# Patient Record
Sex: FEMALE | Race: WHITE | NOT HISPANIC OR LATINO | Employment: FULL TIME | URBAN - NONMETROPOLITAN AREA
[De-identification: names, ages, dates, MRNs, and addresses within clinical notes are randomized per-mention and may not be internally consistent; named-entity substitution may affect disease eponyms.]

---

## 2017-07-17 ENCOUNTER — OFFICE VISIT (OUTPATIENT)
Dept: URGENT CARE | Facility: PHYSICIAN GROUP | Age: 37
End: 2017-07-17
Payer: COMMERCIAL

## 2017-07-17 VITALS
WEIGHT: 210 LBS | BODY MASS INDEX: 33.75 KG/M2 | HEART RATE: 82 BPM | TEMPERATURE: 97.5 F | SYSTOLIC BLOOD PRESSURE: 126 MMHG | OXYGEN SATURATION: 96 % | DIASTOLIC BLOOD PRESSURE: 82 MMHG | HEIGHT: 66 IN

## 2017-07-17 DIAGNOSIS — G44.209 TENSION HEADACHE: ICD-10-CM

## 2017-07-17 DIAGNOSIS — R11.0 NAUSEA: ICD-10-CM

## 2017-07-17 PROCEDURE — 99214 OFFICE O/P EST MOD 30 MIN: CPT | Performed by: PHYSICIAN ASSISTANT

## 2017-07-17 RX ORDER — ONDANSETRON 2 MG/ML
4 INJECTION INTRAMUSCULAR; INTRAVENOUS ONCE
Status: COMPLETED | OUTPATIENT
Start: 2017-07-17 | End: 2017-07-17

## 2017-07-17 RX ORDER — KETOROLAC TROMETHAMINE 30 MG/ML
30 INJECTION, SOLUTION INTRAMUSCULAR; INTRAVENOUS ONCE
Status: COMPLETED | OUTPATIENT
Start: 2017-07-17 | End: 2017-07-17

## 2017-07-17 RX ORDER — CYCLOBENZAPRINE HCL 10 MG
10 TABLET ORAL 3 TIMES DAILY PRN
Qty: 30 TAB | Refills: 0 | Status: SHIPPED | OUTPATIENT
Start: 2017-07-17 | End: 2017-11-14

## 2017-07-17 RX ADMIN — KETOROLAC TROMETHAMINE 30 MG: 30 INJECTION, SOLUTION INTRAMUSCULAR; INTRAVENOUS at 13:33

## 2017-07-17 RX ADMIN — ONDANSETRON 4 MG: 2 INJECTION INTRAMUSCULAR; INTRAVENOUS at 13:34

## 2017-07-17 NOTE — MR AVS SNAPSHOT
"        Karolina Ventura   2017 12:55 PM   Office Visit   MRN: [id number]    Department:  [facility]   Dept Phone:  [phone]    Description:  Female : 1980   Provider:  ELDER AnneC           Reason for Visit     Migraine Headache/hurts behind eyes/patient states it has come and gone/pt states been working too and under stress x2 weeks on and off       Allergies as of 2017     Allergen Noted Reactions    Codeine-Guaifenesin 2015   Hives, Vomiting    Cough Syrup 2015   Hives, Vomiting    Nubain [Methylpara-Nalbuphine-Propylpara] 2015   Vomiting    Percocet [Apap-Fd&C Red #40 Al Wilkes-Oxycodone] 10/07/2016         You were diagnosed with     Tension headache   [307.81.ICD-9-CM]       Nausea   [552531]         Vital Signs     Blood Pressure Pulse Temperature Height Weight Body Mass Index    126/82 mmHg 82 36.4 °C (97.5 °F) 1.676 m (5' 5.98\") 95.255 kg (210 lb) 33.91 kg/m2    Oxygen Saturation Smoking Status                96% Former Smoker          Basic Information     Date Of Birth Sex Race Ethnicity Preferred Language    1980 Female White Non- English      Problem List              ICD-10-CM Priority Class Noted - Resolved    Ruptured ovarian cyst N83.209   2015 - Present    Elevated LDL cholesterol level (Chronic) E78.00   2015 - Present    Obesity E66.9   2015 - Present    Chronic midline low back pain M54.5, G89.29   10/7/2016 - Present    Depression with anxiety F41.8   10/7/2016 - Present      Health Maintenance        Date Due Completion Dates    IMM DTaP/Tdap/Td Vaccine (1 - Tdap) 2000 ---    PAP SMEAR 2002 ---    IMM INFLUENZA (1) 2017 ---            Current Immunizations     No immunizations on file.      Below and/or attached are the medications your provider expects you to take. Review all of your home medications and newly ordered medications with your provider and/or pharmacist. Follow medication " instructions as directed by your provider and/or pharmacist. Please keep your medication list with you and share with your provider. Update the information when medications are discontinued, doses are changed, or new medications (including over-the-counter products) are added; and carry medication information at all times in the event of emergency situations     Allergies:  CODEINE-GUAIFENESIN - Hives,Vomiting     COUGH SYRUP - Hives,Vomiting     NUBAIN - Vomiting     PERCOCET - (reactions not documented)               Medications  Valid as of: July 17, 2017 -  1:44 PM    Generic Name Brand Name Tablet Size Instructions for use    Cyclobenzaprine HCl (Tab) FLEXERIL 10 MG Take 1 Tab by mouth 3 times a day as needed.        Hydrocodone-Acetaminophen (Tab) NORCO 5-325 MG Take 1 Tab by mouth every four hours as needed.        Ibuprofen (Tab) MOTRIN 800 MG Take 1 Tab by mouth every 8 hours as needed.        .                 Medicines prescribed today were sent to:     [facility] [address] [place] [address] [address] [facility]    [address] [place] [address]    Phone: [phone] Fax: [phone]    Open 24 Hours?: No      Medication refill instructions:       If your prescription bottle indicates you have medication refills left, it is not necessary to call your provider’s office. Please contact your pharmacy and they will refill your medication.    If your prescription bottle indicates you do not have any refills left, you may request refills at any time through one of the following ways: The online [facility] system (except Urgent Care), by calling your provider’s office, or by asking your pharmacy to contact your provider’s office with a refill request. Medication refills are processed only during regular business hours and may not be available until the next business day. Your provider may request additional information or to have a follow-up visit with you prior to refilling your medication.      *Please Note: Medication refills are assigned a new Rx number when refilled electronically. Your pharmacy may indicate that no refills were authorized even though a new prescription for the same medication is available at the pharmacy. Please request the medicine by name with the pharmacy before contacting your provider for a refill.           [employer] [facility] Access Code: Activation code not generated  Current [facility] Status: Active

## 2017-07-17 NOTE — PROGRESS NOTES
Chief Complaint   Patient presents with   • Migraine     Headache/hurts behind eyes/patient states it has come and gone/pt states been working too and under stress x2 weeks on and off        HISTORY OF PRESENT ILLNESS: Patient is a 37 y.o. female who presents today because she has a one to two-week history of intermittent worsening headaches. The headache starts her forehead, right around her temples and then onto the back of her neck. This has been slowly increasing in intensity, however, waxing and waning as well. Today was quite severe, so she took some ibuprofen which didn't help and came to the urgent care. She has had some nausea, some mild light sensitivity    Patient Active Problem List    Diagnosis Date Noted   • Chronic midline low back pain 10/07/2016   • Depression with anxiety 10/07/2016   • Elevated LDL cholesterol level 09/22/2015   • Obesity 09/22/2015   • Ruptured ovarian cyst 08/26/2015       Allergies:Codeine-guaifenesin; Cough syrup; Nubain; and Percocet    Current Outpatient Prescriptions Ordered in [facility]   Medication Sig Dispense Refill   • cyclobenzaprine (FLEXERIL) 10 MG Tab Take 1 Tab by mouth 3 times a day as needed. 30 Tab 0   • ibuprofen (MOTRIN) 800 MG Tab Take 1 Tab by mouth every 8 hours as needed. 30 Tab 0   • hydrocodone-acetaminophen (NORCO) 5-325 MG Tab per tablet Take 1 Tab by mouth every four hours as needed. 10 Tab 0     Current Facility-Administered Medications Ordered in Epic   Medication Dose Route Frequency Provider Last Rate Last Dose   • ketorolac (TORADOL) injection 30 mg  30 mg Intramuscular Once Rudolph Dewey PA-C       • ondansetron (ZOFRAN) syringe/vial injection 4 mg  4 mg Intramuscular Once Rudolph Dewey PA-C           Past Medical History   Diagnosis Date   • Ruptured ovarian cyst 8/26/2015   • Elevated LDL cholesterol level 9/22/2015   • Obesity 9/22/2015       Social History   Substance Use Topics   • Smoking status: Former Smoker -- 1.00 packs/day for 15  "years     Types: Cigarettes   • Smokeless tobacco: Never Used   • Alcohol Use: None       No family status information on file.   No family history on file.    ROS:  Review of Systems   Constitutional: Negative for fever, chills, weight loss and malaise/fatigue.   HENT: Negative for ear pain, nosebleeds, congestion, sore throat and neck pain.    Eyes: Negative for blurred vision.   Respiratory: Negative for cough, sputum production, shortness of breath and wheezing.    Cardiovascular: Negative for chest pain, palpitations, orthopnea and leg swelling.   Gastrointestinal: Negative for heartburn, nausea, vomiting and abdominal pain.   Genitourinary: Negative for dysuria, urgency and frequency.     Exam:  Blood pressure 126/82, pulse 82, temperature 36.4 °C (97.5 °F), height 1.676 m (5' 5.98\"), weight 95.255 kg (210 lb), SpO2 96 %.  General:  Well nourished, well developed female in NAD  Head:Normocephalic, atraumatic  Eyes: PERRLA, EOM within normal limits, no conjunctival injection, no scleral icterus, visual fields and acuity grossly intact.  Pulmonary: chest is symmetrical with respiration, no wheezes, crackles, or rhonchi.  Cardiovascular: regular rate and rhythm without murmurs, rubs, or gallops.  Extremities: no clubbing, cyanosis, or edema.  Neurologic: Cranial nerves II-12 grossly intact.    Please note that this dictation was created using voice recognition software. I have made every reasonable attempt to correct obvious errors, but I expect that there are errors of grammar and possibly content that I did not discover before finalizing the note.    Assessment/Plan:  1. Tension headache  cyclobenzaprine (FLEXERIL) 10 MG Tab    ketorolac (TORADOL) injection 30 mg   2. Nausea  ondansetron (ZOFRAN) syringe/vial injection 4 mg       Followup with primary care in the next 7-10 days if not significantly improving, return to the urgent care or go to the emergency room sooner for any worsening of symptoms.         "

## 2017-07-17 NOTE — Clinical Note
July 17, 2017         Patient: Karolina Ventura   YOB: 1980   Date of Visit: 7/17/2017           To Whom it May Concern:    Karolina Vnetura was seen in my clinic on 7/17/2017. She may return to work on 07/18/2017.    If you have any questions or concerns, please don't hesitate to call.        Sincerely,           Rudolph Dewey PA-C  Electronically Signed

## 2017-08-01 ENCOUNTER — OFFICE VISIT (OUTPATIENT)
Dept: MEDICAL GROUP | Facility: CLINIC | Age: 37
End: 2017-08-01
Payer: COMMERCIAL

## 2017-08-01 VITALS
OXYGEN SATURATION: 96 % | DIASTOLIC BLOOD PRESSURE: 76 MMHG | TEMPERATURE: 98.1 F | HEIGHT: 66 IN | SYSTOLIC BLOOD PRESSURE: 124 MMHG | WEIGHT: 207.5 LBS | HEART RATE: 82 BPM | RESPIRATION RATE: 16 BRPM | BODY MASS INDEX: 33.35 KG/M2

## 2017-08-01 DIAGNOSIS — R53.83 LETHARGY: ICD-10-CM

## 2017-08-01 DIAGNOSIS — Z13.6 SCREENING FOR CARDIOVASCULAR CONDITION: ICD-10-CM

## 2017-08-01 DIAGNOSIS — E55.9 VITAMIN D DEFICIENCY: ICD-10-CM

## 2017-08-01 DIAGNOSIS — Z23 NEED FOR VACCINATION: ICD-10-CM

## 2017-08-01 DIAGNOSIS — Z00.00 WELL ADULT EXAM: ICD-10-CM

## 2017-08-01 DIAGNOSIS — E66.9 OBESITY (BMI 30-39.9): ICD-10-CM

## 2017-08-01 PROCEDURE — 90471 IMMUNIZATION ADMIN: CPT | Performed by: PHYSICIAN ASSISTANT

## 2017-08-01 PROCEDURE — 99213 OFFICE O/P EST LOW 20 MIN: CPT | Mod: 25 | Performed by: PHYSICIAN ASSISTANT

## 2017-08-01 PROCEDURE — 90715 TDAP VACCINE 7 YRS/> IM: CPT | Performed by: PHYSICIAN ASSISTANT

## 2017-08-01 ASSESSMENT — PAIN SCALES - GENERAL: PAINLEVEL: NO PAIN

## 2017-08-01 NOTE — PROGRESS NOTES
Chief Complaint   Patient presents with   • Annual Exam     anthem paperwork   • Immunizations     tdap       HISTORY OF PRESENT ILLNESS: Patient is a 37 y.o. female established patient who presents today for evaluation and management of:    Lethargy  Patient has had lethargy for many years but increasingly so recently.     Obesity (BMI 30-39.9)  Patient's adipose distribution tends to lie mostly below her waist. She is not currently attempting to lose weight although she has lost 3 lbs since her visit 2 weeks ago.     Well adult exam  This healthy, 38 y/o female presents for a well adult physical. She is no complaints other than lethargy today.          Patient Active Problem List    Diagnosis Date Noted   • Lethargy 08/01/2017   • Obesity (BMI 30-39.9) 08/01/2017   • Well adult exam 08/01/2017   • Chronic midline low back pain 10/07/2016   • Depression with anxiety 10/07/2016   • Elevated LDL cholesterol level 09/22/2015   • Ruptured ovarian cyst 08/26/2015       Allergies:Codeine-guaifenesin; Cough syrup; Nubain; and Percocet    Current Outpatient Prescriptions   Medication Sig Dispense Refill   • acetaminophen/caffeine/butalbital 325-40-50 mg (FIORICET) -40 MG Tab Take 1 Tab by mouth every 6 hours as needed for Headache.     • cyclobenzaprine (FLEXERIL) 10 MG Tab Take 1 Tab by mouth 3 times a day as needed. 30 Tab 0   • ibuprofen (MOTRIN) 800 MG Tab Take 1 Tab by mouth every 8 hours as needed. 30 Tab 0     No current facility-administered medications for this visit.       Social History   Substance Use Topics   • Smoking status: Former Smoker -- 1.00 packs/day for 15 years     Types: Cigarettes   • Smokeless tobacco: Never Used   • Alcohol Use: No       No family status information on file.   History reviewed. No pertinent family history.    Review of Systems:   Constitutional: Negative for fever, chills, weight loss and malaise/fatigue.   HENT: Negative for ear pain, nosebleeds, congestion, sore throat  "and neck pain.    Eyes: Negative for blurred vision.   Respiratory: Negative for cough, sputum production, shortness of breath and wheezing.    Cardiovascular: Negative for chest pain, palpitations, orthopnea and leg swelling.   Gastrointestinal: Negative for heartburn, nausea, vomiting and abdominal pain.   Genitourinary: Negative for dysuria, urgency and frequency.   Musculoskeletal: Negative for myalgias, back pain and joint pain.   Skin: Negative for rash and itching.   Neurological: Negative for dizziness, tingling, tremors, sensory change, focal weakness and headaches.   Endo/Heme/Allergies: Does not bruise/bleed easily.   Psychiatric/Behavioral: Negative for depression, suicidal ideas and memory loss.  The patient is not nervous/anxious and does not have insomnia.      Exam:  Blood pressure 124/76, pulse 82, temperature 36.7 °C (98.1 °F), resp. rate 16, height 1.676 m (5' 5.98\"), weight 94.121 kg (207 lb 8 oz), SpO2 96 %.  Body mass index is 33.51 kg/(m^2).  General:  Obese female in NAD  Head: is grossly normal.  Neck: Supple without masses. Thyroid is not visibly enlarged.  Pulmonary: Clear to ausculation. Normal effort. No rales, ronchi, or wheezing.  Cardiovascular: Regular rate and rhythm without murmur. Carotid and radial pulses are intact and equal bilaterally.  Extremities: no clubbing, cyanosis, or edema.    Medical decision-making and discussion:  1. Well adult exam  - NICOTINE + METABOLITES CONFIRMATION; Future  - CMP (12)    2. Lethargy  - CMP (12)  - TSH WITH REFLEX TO FT4; Future  - VITAMIN B12; Future  - VITAMIN D, 1,25 + 25-HYDROXY    3. Obesity (BMI 30-39.9)  Diet and exercise discussed.   - CMP (12)  - TSH WITH REFLEX TO FT4; Future  - Patient identified as having weight management issue.  Appropriate orders and counseling given.    4. Screening for cardiovascular condition  - LIPID PANEL  - CMP (12)    5. Vitamin D deficiency  - VITAMIN D, 1,25 + 25-HYDROXY    6. Need for vaccination  - " Tdap =>6yo IM        Return for acute illness as needed. .

## 2017-08-01 NOTE — ASSESSMENT & PLAN NOTE
Patient's adipose distribution tends to lie mostly below her waist. She is not currently attempting to lose weight although she has lost 3 lbs since her visit 2 weeks ago.

## 2017-08-01 NOTE — MR AVS SNAPSHOT
"        Karolina Ventura   2017 10:20 AM   Office Visit   MRN: [id number]    Department:  [facility]   Dept Phone:  [phone]    Description:  Female : 1980   Provider:  Johanna Figueroa PA-C           Reason for Visit     Annual Exam anthem paperwork    Immunizations tdap      Allergies as of 2017     Allergen Noted Reactions    Codeine-Guaifenesin 2015   Hives, Vomiting    Cough Syrup 2015   Hives, Vomiting    Nubain [Methylpara-Nalbuphine-Propylpara] 2015   Vomiting    Percocet [Apap-Fd&C Red #40 Al Wilkes-Oxycodone] 10/07/2016         You were diagnosed with     Well adult exam   [761642]       Lethargy   [066982]       Obesity (BMI 30-39.9)   [553638]       Screening for cardiovascular condition   [929321]       Vitamin D deficiency   [3486067]       Need for vaccination   [299669]         Vital Signs     Blood Pressure Pulse Temperature Respirations Height Weight    124/76 mmHg 82 36.7 °C (98.1 °F) 16 1.676 m (5' 5.98\") 94.121 kg (207 lb 8 oz)    Body Mass Index Oxygen Saturation Smoking Status             33.51 kg/m2 96% Former Smoker         Basic Information     Date Of Birth Sex Race Ethnicity Preferred Language    1980 Female White Non- English      Your appointments     Aug 07, 2017  9:40 AM   Established Patient with Johanna Figueroa PA-C   [facility] (--)    [facility] [address] [id number] [phone]           You will be receiving a confirmation call a few days before your appointment from our automated call confirmation system.              Problem List              ICD-10-CM Priority Class Noted - Resolved    Ruptured ovarian cyst N83.209   2015 - Present    Elevated LDL cholesterol level (Chronic) E78.00   2015 - Present    Obesity E66.9   2015 - Present    Chronic midline low back pain M54.5, G89.29   10/7/2016 - Present    Depression with anxiety F41.8   10/7/2016 - Present   " Lethargy R53.83   8/1/2017 - Present    Obesity (BMI 30-39.9) E66.9   8/1/2017 - Present      Health Maintenance        Date Due Completion Dates    IMM DTaP/Tdap/Td Vaccine (1 - Tdap) 6/25/2000 ---    PAP SMEAR 6/25/2002 ---    IMM INFLUENZA (1) 9/1/2017 ---            Current Immunizations     Tdap Vaccine  Incomplete      Below and/or attached are the medications your provider expects you to take. Review all of your home medications and newly ordered medications with your provider and/or pharmacist. Follow medication instructions as directed by your provider and/or pharmacist. Please keep your medication list with you and share with your provider. Update the information when medications are discontinued, doses are changed, or new medications (including over-the-counter products) are added; and carry medication information at all times in the event of emergency situations     Allergies:  CODEINE-GUAIFENESIN - Hives,Vomiting     COUGH SYRUP - Hives,Vomiting     NUBAIN - Vomiting     PERCOCET - (reactions not documented)               Medications  Valid as of: August 01, 2017 - 10:21 AM    Generic Name Brand Name Tablet Size Instructions for use    Butalbital-APAP-Caffeine (Tab) FIORICET -40 MG Take 1 Tab by mouth every 6 hours as needed for Headache.        Cyclobenzaprine HCl (Tab) FLEXERIL 10 MG Take 1 Tab by mouth 3 times a day as needed.        Ibuprofen (Tab) MOTRIN 800 MG Take 1 Tab by mouth every 8 hours as needed.        .                 Medicines prescribed today were sent to:     [facility] [address] [place], [place] [address] [facility]    [address] [facility] [address]    Phone: [phone] Fax: [phone]    Open 24 Hours?: No      Medication refill instructions:       If your prescription bottle indicates you have medication refills left, it is not necessary to call your provider’s office. Please contact your pharmacy and they will refill your medication.    If your  prescription bottle indicates you do not have any refills left, you may request refills at any time through one of the following ways: The online [facility] system (except Urgent Care), by calling your provider’s office, or by asking your pharmacy to contact your provider’s office with a refill request. Medication refills are processed only during regular business hours and may not be available until the next business day. Your provider may request additional information or to have a follow-up visit with you prior to refilling your medication.   *Please Note: Medication refills are assigned a new Rx number when refilled electronically. Your pharmacy may indicate that no refills were authorized even though a new prescription for the same medication is available at the pharmacy. Please request the medicine by name with the pharmacy before contacting your provider for a refill.        Your To Do List     Future Labs/Procedures Complete By Expires    NICOTINE + METABOLITES CONFIRMATION  As directed 8/1/2018    TSH WITH REFLEX TO FT4  As directed 8/1/2018    VITAMIN B12  As directed 8/1/2018         [facility] Access Code: Activation code not generated  Current [facility] Status: Active

## 2017-08-01 NOTE — ASSESSMENT & PLAN NOTE
This healthy, 38 y/o female presents for a well adult physical. She is no complaints other than lethargy today.

## 2017-08-22 ENCOUNTER — HOSPITAL ENCOUNTER (OUTPATIENT)
Dept: LAB | Facility: MEDICAL CENTER | Age: 37
End: 2017-08-22
Attending: PHYSICIAN ASSISTANT
Payer: COMMERCIAL

## 2017-08-22 ENCOUNTER — OFFICE VISIT (OUTPATIENT)
Dept: URGENT CARE | Facility: PHYSICIAN GROUP | Age: 37
End: 2017-08-22
Payer: COMMERCIAL

## 2017-08-22 VITALS
WEIGHT: 204 LBS | DIASTOLIC BLOOD PRESSURE: 72 MMHG | RESPIRATION RATE: 14 BRPM | TEMPERATURE: 98.1 F | BODY MASS INDEX: 32.78 KG/M2 | SYSTOLIC BLOOD PRESSURE: 118 MMHG | HEART RATE: 80 BPM | OXYGEN SATURATION: 95 % | HEIGHT: 66 IN

## 2017-08-22 DIAGNOSIS — N64.4 BREAST TENDERNESS: ICD-10-CM

## 2017-08-22 DIAGNOSIS — R53.83 FATIGUE, UNSPECIFIED TYPE: ICD-10-CM

## 2017-08-22 DIAGNOSIS — R11.2 NAUSEA AND VOMITING, INTRACTABILITY OF VOMITING NOT SPECIFIED, UNSPECIFIED VOMITING TYPE: ICD-10-CM

## 2017-08-22 DIAGNOSIS — R82.90 ABNORMAL URINALYSIS: ICD-10-CM

## 2017-08-22 LAB
APPEARANCE UR: CLEAR
B-HCG SERPL-ACNC: <0.6 MIU/ML (ref 0–5)
BILIRUB UR STRIP-MCNC: NORMAL MG/DL
COLOR UR AUTO: NORMAL
GLUCOSE UR STRIP.AUTO-MCNC: NORMAL MG/DL
INT CON NEG: NEGATIVE
INT CON POS: POSITIVE
KETONES UR STRIP.AUTO-MCNC: NORMAL MG/DL
LEUKOCYTE ESTERASE UR QL STRIP.AUTO: NORMAL
NITRITE UR QL STRIP.AUTO: NORMAL
PH UR STRIP.AUTO: 7.5 [PH] (ref 5–8)
POC URINE PREGNANCY TEST: NEGATIVE
PROT UR QL STRIP: NORMAL MG/DL
RBC UR QL AUTO: NORMAL
SP GR UR STRIP.AUTO: 1
UROBILINOGEN UR STRIP-MCNC: NORMAL MG/DL

## 2017-08-22 PROCEDURE — 36415 COLL VENOUS BLD VENIPUNCTURE: CPT

## 2017-08-22 PROCEDURE — 99214 OFFICE O/P EST MOD 30 MIN: CPT | Performed by: PHYSICIAN ASSISTANT

## 2017-08-22 PROCEDURE — 81002 URINALYSIS NONAUTO W/O SCOPE: CPT | Performed by: PHYSICIAN ASSISTANT

## 2017-08-22 PROCEDURE — 81025 URINE PREGNANCY TEST: CPT | Performed by: PHYSICIAN ASSISTANT

## 2017-08-22 PROCEDURE — 84702 CHORIONIC GONADOTROPIN TEST: CPT

## 2017-08-22 RX ORDER — METOCLOPRAMIDE 10 MG/1
10 TABLET ORAL 3 TIMES DAILY PRN
Qty: 20 TAB | Refills: 0 | Status: SHIPPED | OUTPATIENT
Start: 2017-08-22 | End: 2017-11-14

## 2017-08-22 NOTE — PROGRESS NOTES
Chief Complaint   Patient presents with   • Amenorrhea     n[place]sea / sluggish       HISTORY OF PRESENT ILLNESS: Patient is a 37 y.o. female who presents today for evaluation of possible pregnancy. Patient complains of a two-week history of nausea, vomiting, and fatigue. She denies diarrhea, fever, localized abdominal pain or recent travel, recent antibiotics, sick contacts, and bad food or drink that she knows of. She complains of breast tenderness and states that it feels consistent with her pregnancies. She indicates that her pregnancy hormone does not show up in her urine and therefore needs blood work. Patient denies dysuria, hematuria, suprapubic pain. Her LMP was 7/19/17.    Patient Active Problem List    Diagnosis Date Noted   • Lethargy 08/01/2017   • Obesity (BMI 30-39.9) 08/01/2017   • Well adult exam 08/01/2017   • Chronic midline low back pain 10/07/2016   • Depression with anxiety 10/07/2016   • Elevated LDL cholesterol level 09/22/2015   • Ruptured ovarian cyst 08/26/2015       Allergies:Codeine-guaifenesin; Cough syrup; Nubain; and Percocet    Current Outpatient Prescriptions Ordered in [facility]   Medication Sig Dispense Refill   • metoclopramide (REGLAN) 10 MG Tab Take 1 Tab by mouth 3 times a day as needed (nausea). 20 Tab 0   • acetaminophen/caffeine/butalbital 325-40-50 mg (FIORICET) -40 MG Tab Take 1 Tab by mouth every 6 hours as needed for Headache.     • cyclobenzaprine (FLEXERIL) 10 MG Tab Take 1 Tab by mouth 3 times a day as needed. 30 Tab 0   • ibuprofen (MOTRIN) 800 MG Tab Take 1 Tab by mouth every 8 hours as needed. 30 Tab 0     No current Epic-ordered facility-administered medications on file.       Past Medical History   Diagnosis Date   • Ruptured ovarian cyst 8/26/2015   • Elevated LDL cholesterol level 9/22/2015   • Obesity 9/22/2015       Social History   Substance Use Topics   • Smoking status: Former Smoker -- 1.00 packs/day for 15 years     Types: Cigarettes   • Smokeless  "tobacco: Never Used   • Alcohol Use: No       No family status information on file.   History reviewed. No pertinent family history.    ROS:    Review of Systems   Constitutional: Negative for fever, chills, weight loss and malaise/fatigue.   HENT: Negative for ear pain, nosebleeds, congestion, sore throat and neck pain.    Eyes: Negative for blurred vision.   Respiratory: Negative for cough, sputum production, shortness of breath and wheezing.    Cardiovascular: Negative for chest pain, palpitations, orthopnea and leg swelling.   Gastrointestinal: Negative for heartburn, and abdominal pain.   Genitourinary: Negative for dysuria, urgency and frequency.       Exam:  Blood pressure 118/72, pulse 80, temperature 36.7 °C (98.1 °F), resp. rate 14, height 1.676 m (5' 6\"), weight 92.534 kg (204 lb), SpO2 95 %.  General: Well developed, well nourished. No distress.  HEENT: Head is grossly normal.  Neck: Trachea midline, no masses. No thyromegaly.  Pulmonary: Clear to ausculation and percussion.  Normal effort. No rales, ronchi, or wheezing.   Cardiovascular: Regular rate and rhythm without murmur. No edema.   Back: No CVA tenderness noted.  Abdomen: Soft, non-tender, nondistended. No hepatosplenomegaly.   Neurologic: Grossly nonfocal.  Lymph: No cervical lymphadenopathy noted.  Skin: Warm, dry, good turgor. No rashes in visible areas.   Psych: Normal mood. Alert and oriented x3. Judgment and insight is normal.    UA: Trace leukocytes, hemolyzed blood  Urine hCG: Negative    Assessment/Plan:  There is not enough urine for culture. Will contact patient with lab results. Follow-up with primary care provider for worsening or persistent symptoms.  1. Fatigue, unspecified type  POCT Pregnancy    HCG QUANTITATIVE   2. Nausea and vomiting, intractability of vomiting not specified, unspecified vomiting type  POCT Urinalysis    HCG QUANTITATIVE    metoclopramide (REGLAN) 10 MG Tab   3. Breast tenderness  HCG QUANTITATIVE   4. " Abnormal urinalysis  URINE CULTURE(NEW)

## 2017-08-22 NOTE — MR AVS SNAPSHOT
"        Karolina Ventura   2017 9:15 AM   Office Visit   MRN: [id number]    Department:  [facility]   Dept Phone:  [phone]    Description:  Female : 1980   Provider:  Joy Varma PA-C           Reason for Visit     Amenorrhea [place] / sluggish      Allergies as of 2017     Allergen Noted Reactions    Codeine-Guaifenesin 2015   Hives, Vomiting    Cough Syrup 2015   Hives, Vomiting    Nubain [Methylpara-Nalbuphine-Propylpara] 2015   Vomiting    Percocet [Apap-Fd&C Red #40 [place]-Oxycodone] 10/07/2016         You were diagnosed with     Fatigue, unspecified type   [0092026]       Nausea and vomiting, intractability of vomiting not specified, unspecified vomiting type   [2311316]       Breast tenderness   [594950]       Abnormal urinalysis   [271111]         Vital Signs     Blood Pressure Pulse Temperature Respirations Height Weight    118/72 mmHg 80 36.7 °C (98.1 °F) 14 1.676 m (5' 6\") 92.534 kg (204 lb)    Body Mass Index Oxygen Saturation Smoking Status             32.94 kg/m2 95% Former Smoker         Basic Information     Date Of Birth Sex Race Ethnicity Preferred Language    1980 Female White Non- English      Problem List              ICD-10-CM Priority Class Noted - Resolved    Ruptured ovarian cyst N83.209   2015 - Present    Elevated LDL cholesterol level (Chronic) E78.00   2015 - Present    Chronic midline low back pain M54.5, G89.29   10/7/2016 - Present    Depression with anxiety F41.8   10/7/2016 - Present    Lethargy R53.83   2017 - Present    Obesity (BMI 30-39.9) E66.9   2017 - Present    Well adult exam Z00.00   2017 - Present      Health Maintenance        Date Due Completion Dates    PAP SMEAR 2002 ---    IMM INFLUENZA (1) 2017 ---    IMM DTaP/Tdap/Td Vaccine (2 - Td) 2027            Results     POCT Pregnancy      Component Value Standard Range & Units    POC Urine Pregnancy Test " negative Negative    Internal Control Positive Positive     Internal Control Negative Negative                 POCT Urinalysis      Component Value Standard Range & Units    POC Color straw Negative    POC Appearance clear Negative    POC Leukocyte Esterase trace Negative    POC Nitrites neg Negative    POC Urobiligen neg Negative (0.2) mg/dL    POC Protein trace Negative mg/dL    POC Urine PH 7.5 5.0 - 8.0    POC Blood hemolysed Negative    POC Specific Gravity 1.005 <1.005 - >1.030    POC Ketones .trace Negative mg/dL    POC Biliruben neg Negative mg/dL    POC Glucose neg Negative mg/dL                        Current Immunizations     Tdap Vaccine 8/1/2017      Below and/or attached are the medications your provider expects you to take. Review all of your home medications and newly ordered medications with your provider and/or pharmacist. Follow medication instructions as directed by your provider and/or pharmacist. Please keep your medication list with you and share with your provider. Update the information when medications are discontinued, doses are changed, or new medications (including over-the-counter products) are added; and carry medication information at all times in the event of emergency situations     Allergies:  CODEINE-GUAIFENESIN - Hives,Vomiting     COUGH SYRUP - Hives,Vomiting     NUBAIN - Vomiting     PERCOCET - (reactions not documented)               Medications  Valid as of: August 22, 2017 - 10:05 AM    Generic Name Brand Name Tablet Size Instructions for use    Butalbital-APAP-Caffeine (Tab) FIORICET -40 MG Take 1 Tab by mouth every 6 hours as needed for Headache.        Cyclobenzaprine HCl (Tab) FLEXERIL 10 MG Take 1 Tab by mouth 3 times a day as needed.        Ibuprofen (Tab) MOTRIN 800 MG Take 1 Tab by mouth every 8 hours as needed.        Metoclopramide HCl (Tab) REGLAN 10 MG Take 1 Tab by mouth 3 times a day as needed (nausea).        .                 Medicines prescribed today  were sent to:     [facility] [place], [place] - [address]    [address] [place] [address]    Phone: [phone] Fax: [phone]    Open 24 Hours?: No      Medication refill instructions:       If your prescription bottle indicates you have medication refills left, it is not necessary to call your provider’s office. Please contact your pharmacy and they will refill your medication.    If your prescription bottle indicates you do not have any refills left, you may request refills at any time through one of the following ways: The online MyWebzz system (except Urgent Care), by calling your provider’s office, or by asking your pharmacy to contact your provider’s office with a refill request. Medication refills are processed only during regular business hours and may not be available until the next business day. Your provider may request additional information or to have a follow-up visit with you prior to refilling your medication.   *Please Note: Medication refills are assigned a new Rx number when refilled electronically. Your pharmacy may indicate that no refills were authorized even though a new prescription for the same medication is available at the pharmacy. Please request the medicine by name with the pharmacy before contacting your provider for a refill.        Your To Do List     Future Labs/Procedures Complete By Expires    HCG QUANTITATIVE  As directed 8/22/2018         MyWebzz Access Code: Activation code not generated  Current MyWebzz Status: Active

## 2017-08-25 ENCOUNTER — TELEPHONE (OUTPATIENT)
Dept: URGENT CARE | Facility: PHYSICIAN GROUP | Age: 37
End: 2017-08-25

## 2017-09-12 DIAGNOSIS — G44.209 TENSION HEADACHE: ICD-10-CM

## 2017-09-13 RX ORDER — CYCLOBENZAPRINE HCL 10 MG
10 TABLET ORAL 3 TIMES DAILY PRN
Qty: 30 TAB | Refills: 0 | OUTPATIENT
Start: 2017-09-13

## 2017-09-13 RX ORDER — BUTALBITAL, ACETAMINOPHEN AND CAFFEINE 50; 325; 40 MG/1; MG/1; MG/1
1 TABLET ORAL EVERY 6 HOURS PRN
Qty: 30 TAB | OUTPATIENT
Start: 2017-09-13

## 2017-09-14 DIAGNOSIS — G44.209 TENSION HEADACHE: ICD-10-CM

## 2017-09-14 RX ORDER — CYCLOBENZAPRINE HCL 10 MG
10 TABLET ORAL 3 TIMES DAILY PRN
Qty: 30 TAB | Refills: 0 | OUTPATIENT
Start: 2017-09-14

## 2017-09-14 RX ORDER — BUTALBITAL, ACETAMINOPHEN AND CAFFEINE 50; 325; 40 MG/1; MG/1; MG/1
1 TABLET ORAL EVERY 6 HOURS PRN
Qty: 30 TAB | Refills: 0 | OUTPATIENT
Start: 2017-09-14

## 2017-11-06 ENCOUNTER — OFFICE VISIT (OUTPATIENT)
Dept: URGENT CARE | Facility: PHYSICIAN GROUP | Age: 37
End: 2017-11-06
Payer: MEDICAID

## 2017-11-06 VITALS
TEMPERATURE: 97.2 F | WEIGHT: 203 LBS | DIASTOLIC BLOOD PRESSURE: 82 MMHG | SYSTOLIC BLOOD PRESSURE: 126 MMHG | RESPIRATION RATE: 16 BRPM | BODY MASS INDEX: 32.62 KG/M2 | HEART RATE: 94 BPM | OXYGEN SATURATION: 97 % | HEIGHT: 66 IN

## 2017-11-06 DIAGNOSIS — R11.2 NAUSEA AND VOMITING, INTRACTABILITY OF VOMITING NOT SPECIFIED, UNSPECIFIED VOMITING TYPE: ICD-10-CM

## 2017-11-06 LAB
APPEARANCE UR: CLEAR
BILIRUB UR STRIP-MCNC: ABNORMAL MG/DL
COLOR UR AUTO: YELLOW
GLUCOSE UR STRIP.AUTO-MCNC: ABNORMAL MG/DL
INT CON NEG: NORMAL
INT CON POS: NORMAL
KETONES UR STRIP.AUTO-MCNC: ABNORMAL MG/DL
LEUKOCYTE ESTERASE UR QL STRIP.AUTO: ABNORMAL
NITRITE UR QL STRIP.AUTO: ABNORMAL
PH UR STRIP.AUTO: 8.5 [PH] (ref 5–8)
POC URINE PREGNANCY TEST: NORMAL
PROT UR QL STRIP: ABNORMAL MG/DL
RBC UR QL AUTO: ABNORMAL
SP GR UR STRIP.AUTO: 1
UROBILINOGEN UR STRIP-MCNC: ABNORMAL MG/DL

## 2017-11-06 PROCEDURE — 81025 URINE PREGNANCY TEST: CPT | Performed by: PHYSICIAN ASSISTANT

## 2017-11-06 PROCEDURE — 99214 OFFICE O/P EST MOD 30 MIN: CPT | Mod: 25 | Performed by: PHYSICIAN ASSISTANT

## 2017-11-06 PROCEDURE — 81002 URINALYSIS NONAUTO W/O SCOPE: CPT | Performed by: PHYSICIAN ASSISTANT

## 2017-11-06 RX ORDER — ONDANSETRON 4 MG/1
4 TABLET, FILM COATED ORAL EVERY 4 HOURS PRN
Qty: 20 TAB | Refills: 0 | Status: SHIPPED | OUTPATIENT
Start: 2017-11-06 | End: 2017-11-14

## 2017-11-06 NOTE — PROGRESS NOTES
Chief Complaint   Patient presents with   • Emesis     x2w       HISTORY OF PRESENT ILLNESS: Patient is a 37 y.o. female who presents today becauseShe has a two-week history of intermittent nausea and vomiting. This comes and goes every 1-2 days. She has not felt sick, has not had any diarrhea, last bowel movement was this morning without any blood or mucus. She does report a history of allergies and some mild history of acid reflux but does not like to take any pills that she has not been taking any medications for it.    Patient Active Problem List    Diagnosis Date Noted   • Lethargy 08/01/2017   • Obesity (BMI 30-39.9) 08/01/2017   • Well adult exam 08/01/2017   • Chronic midline low back pain 10/07/2016   • Depression with anxiety 10/07/2016   • Elevated LDL cholesterol level 09/22/2015   • Ruptured ovarian cyst 08/26/2015       Allergies:Codeine-guaifenesin; Cough syrup; Nubain [methylpara-nalbuphine-propylpara]; and Percocet [apap-fd&c red #40 [place]-oxycodone]    Current Outpatient Prescriptions Ordered in [facility]   Medication Sig Dispense Refill   • ondansetron (ZOFRAN) 4 MG Tab tablet Take 1 Tab by mouth every four hours as needed. 20 Tab 0   • metoclopramide (REGLAN) 10 MG Tab Take 1 Tab by mouth 3 times a day as needed (nausea). 20 Tab 0   • acetaminophen/caffeine/butalbital 325-40-50 mg (FIORICET) -40 MG Tab Take 1 Tab by mouth every 6 hours as needed for Headache.     • cyclobenzaprine (FLEXERIL) 10 MG Tab Take 1 Tab by mouth 3 times a day as needed. 30 Tab 0   • ibuprofen (MOTRIN) 800 MG Tab Take 1 Tab by mouth every 8 hours as needed. 30 Tab 0     No current Epic-ordered facility-administered medications on file.        Past Medical History:   Diagnosis Date   • Elevated LDL cholesterol level 9/22/2015   • Obesity 9/22/2015   • Ruptured ovarian cyst 8/26/2015       Social History   Substance Use Topics   • Smoking status: Former Smoker     Packs/day: 1.00     Years: 15.00     Types: Cigarettes  "  • Smokeless tobacco: Never Used   • Alcohol use No       No family status information on file.   No family history on file.    ROS:  Review of Systems   Constitutional: Negative for fever, chills, weight loss and malaise/fatigue.   HENT: Negative for ear pain, nosebleeds,Positive for nasal congestion, no sore throat and neck pain.    Eyes: Negative for blurred vision.   Respiratory: Negative for cough, sputum production, shortness of breath and wheezing.    Cardiovascular: Negative for chest pain, palpitations, orthopnea and leg swelling.   Gastrointestinal: Positive for for heartburn, positive for nausea, vomiting and generalized cramping abdominal pain.   Genitourinary: Negative for dysuria, urgency and frequency.     Exam:  Blood pressure 126/82, pulse 94, temperature 36.2 °C (97.2 °F), resp. rate 16, height 1.676 m (5' 6\"), weight 92.1 kg (203 lb), SpO2 97 %.  General:  Well nourished, well developed female in NAD  Head:Normocephalic, atraumatic  Eyes: PERRLA, EOM within normal limits, no conjunctival injection, no scleral icterus, visual fields and acuity grossly intact.  Ears: Normal shape and symmetry, no tenderness, no discharge. External canals are without any significant edema or erythema. Tympanic membranes are without any inflammation, no effusion. Gross auditory acuity is intact  Nose: Symmetrical without tenderness, no discharge. Nasal mucosa is pale and edematous bilaterally  Mouth: reasonable hygiene, no erythema exudates or tonsillar enlargement.  Neck: no masses, range of motion within normal limits, no tracheal deviation. No obvious thyroid enlargement.  Pulmonary: chest is symmetrical with respiration, no wheezes, crackles, or rhonchi.  Cardiovascular: regular rate and rhythm without murmurs, rubs, or gallops.  Abdomen: Nondistended, bowel tones all quadrants, soft, mild generalized tenderness to palpation without any specific Pettit's or McBurney's point tenderness, no organomegaly, no rebound " or referred rebound tenderness.  Extremities: no clubbing, cyanosis, or edema.    Please note that this dictation was created using voice recognition software. I have made every reasonable attempt to correct obvious errors, but I expect that there are errors of grammar and possibly content that I did not discover before finalizing the note.    Assessment/Plan:  1. Nausea and vomiting, intractability of vomiting not specified, unspecified vomiting type  POCT Urinalysis    POCT Pregnancy    ondansetron (ZOFRAN) 4 MG Tab tablet   Have recommended that she is on over-the-counter Prilosec, as well as an over-the-counter allergy medication. Follow-up with primary care    Followup with primary care in the next 7-10 days if not significantly improving, return to the urgent care or go to the emergency room sooner for any worsening of symptoms.

## 2017-11-06 NOTE — LETTER
November 6, 2017         Patient: Karolina Ventura   YOB: 1980   Date of Visit: 11/6/2017           To Whom it May Concern:    Karolina Ventura was seen in my clinic on 11/6/2017. She may return to work on 11/07/2017, Please excuse any recent absence.    If you have any questions or concerns, please don't hesitate to call.        Sincerely,           Rudolph Dewey P.A.-C.  Electronically Signed

## 2017-11-14 ENCOUNTER — OFFICE VISIT (OUTPATIENT)
Dept: URGENT CARE | Facility: PHYSICIAN GROUP | Age: 37
End: 2017-11-14
Payer: MEDICAID

## 2017-11-14 VITALS
OXYGEN SATURATION: 97 % | DIASTOLIC BLOOD PRESSURE: 74 MMHG | HEART RATE: 96 BPM | TEMPERATURE: 99.1 F | WEIGHT: 206 LBS | BODY MASS INDEX: 33.11 KG/M2 | RESPIRATION RATE: 16 BRPM | SYSTOLIC BLOOD PRESSURE: 116 MMHG | HEIGHT: 66 IN

## 2017-11-14 DIAGNOSIS — R07.9 CHEST PAIN, UNSPECIFIED TYPE: ICD-10-CM

## 2017-11-14 PROCEDURE — 99214 OFFICE O/P EST MOD 30 MIN: CPT | Performed by: FAMILY MEDICINE

## 2017-11-14 NOTE — PROGRESS NOTES
Chief Complaint:    Chief Complaint   Patient presents with   • Chest Pain       History of Present Illness:    This is a new problem. She started with chest pain last night around 11 PM. She has h/o esophageal spasms and felt it could be that. She took a Protonix which helped a little. Still has the pain, rates it 7/10 now. This AM was 9/10 severity. Reports she was seen in ER for similar 11/20/16 and was given GI cocktail in ER which helped and then given a dose to take home to use if needed. She would like to try GI cocktail today. Started coughing this AM soon after she woke up. No fever. Mild nasal symptoms she attributes to allergies. Was told by work to come get doctor's note.      Review of Systems:    Constitutional: Negative for fever, chills, and diaphoresis.   Eyes: Negative for change in vision, photophobia, pain, redness, and discharge.  ENT: See HPI.    Respiratory: See HPI.  Cardiovascular: See HPI.  Gastrointestinal: See HPI.  Genitourinary: Negative for dysuria, urinary urgency, urinary frequency, hematuria, and flank pain.   Musculoskeletal: Negative for myalgias, joint pain, neck pain, and back pain.   Skin: Negative for rash and itching.   Neurological: Negative for dizziness, tingling, tremors, sensory change, speech change, focal weakness, seizures, loss of consciousness, and headaches.   Endo: Negative for polydipsia.   Heme: Does not bruise/bleed easily.   Psychiatric/Behavioral: Negative for depression, suicidal ideas, hallucinations, memory loss and substance abuse. The patient is not nervous/anxious and does not have insomnia.      Past Medical History:    Past Medical History:   Diagnosis Date   • Elevated LDL cholesterol level 9/22/2015   • Obesity 9/22/2015   • Ruptured ovarian cyst 8/26/2015       Past Surgical History:    History reviewed. No pertinent surgical history.    Social History:    Social History     Social History   • Marital status:      Spouse name: N/A   • Number  "of children: N/A   • Years of education: N/A     Occupational History   • Not on file.     Social History Main Topics   • Smoking status: Former Smoker     Packs/day: 1.00     Years: 15.00     Types: Cigarettes   • Smokeless tobacco: Never Used   • Alcohol use No   • Drug use: No   • Sexual activity: Yes     Partners: Male     Other Topics Concern   • Not on file     Social History Narrative   • No narrative on file       Family History:    History reviewed. No pertinent family history.    Medications:    No current outpatient prescriptions on file prior to visit.     No current facility-administered medications on file prior to visit.        Allergies:    Allergies   Allergen Reactions   • Codeine-Guaifenesin Hives and Vomiting   • Cough Syrup Hives and Vomiting   • Nubain [Methylpara-Nalbuphine-Propylpara] Vomiting   • Percocet [Apap-Fd&C Red #40 [place]-Oxycodone]          Vitals:    Vitals:    11/14/17 0906   BP: 116/74   Pulse: 96   Resp: 16   Temp: 37.3 °C (99.1 °F)   SpO2: 97%   Weight: 93.4 kg (206 lb)   Height: 1.676 m (5' 6\")       Physical Exam:    Constitutional: Vital signs reviewed. Appears well-developed and well-nourished. Regular coughing. No acute distress.   Eyes: Sclera white, conjunctivae clear.   ENT: External ears normal. Hearing normal. Nasal mucosa pink. Lips/teeth are normal. Oral mucosa pink and moist. Posterior pharynx: WNL.  Neck: Neck supple.   Cardiovascular: Regular rate and rhythm. No murmur.  Pulmonary/Chest: Respirations non-labored. Clear to auscultation bilaterally.  Lymph: Cervical nodes without tenderness or enlargement.  Musculoskeletal: Normal gait. Normal range of motion. No muscular atrophy or weakness.  Neurological: Alert and oriented to person, place, and time. Muscle tone normal. Coordination normal.   Skin: No rashes or lesions. Warm, dry, normal turgor.  Psychiatric: Normal mood and affect. Behavior is normal. Judgment and thought content normal.       Assessment / " Plan:    1. Chest pain, unspecified type      Work note given - excuse for 11/14/17.    Discussed with her DDX and management options.    GI cocktail given in clinic - she felt it helped some. Declines to take home a dose to use if needed.    Declines EKG here today.    Advised we do not have imaging here today and if needed I can order CXR to be done at [facility] location today - she declines.    She reports she just wants to go home now and rest and if problem worsens or persists, she will go to ER.    May use OTC cough med prn.    May return to urgent care if needed.

## 2017-11-14 NOTE — LETTER
November 14, 2017         Patient: Karolina Ventura   YOB: 1980   Date of Visit: 11/14/2017           To Whom it May Concern:    Karolina Ventura was seen in my clinic on 11/14/2017.     Please excuse from work for 11/14/17 due to medical condition.    If you have any questions or concerns, please don't hesitate to call.        Sincerely,           Lucas Walls M.D.  Electronically Signed

## 2018-01-29 ENCOUNTER — HOSPITAL ENCOUNTER (EMERGENCY)
Facility: MEDICAL CENTER | Age: 38
End: 2018-01-29
Attending: EMERGENCY MEDICINE
Payer: MEDICAID

## 2018-01-29 VITALS
HEART RATE: 69 BPM | BODY MASS INDEX: 31.04 KG/M2 | OXYGEN SATURATION: 98 % | TEMPERATURE: 96.5 F | SYSTOLIC BLOOD PRESSURE: 116 MMHG | WEIGHT: 193.12 LBS | DIASTOLIC BLOOD PRESSURE: 72 MMHG | HEIGHT: 66 IN | RESPIRATION RATE: 16 BRPM

## 2018-01-29 DIAGNOSIS — R10.84 GENERALIZED ABDOMINAL PAIN: ICD-10-CM

## 2018-01-29 DIAGNOSIS — R11.2 NAUSEA AND VOMITING, INTRACTABILITY OF VOMITING NOT SPECIFIED, UNSPECIFIED VOMITING TYPE: ICD-10-CM

## 2018-01-29 LAB
ALBUMIN SERPL BCP-MCNC: 3.9 G/DL (ref 3.2–4.9)
ALBUMIN/GLOB SERPL: 1.3 G/DL
ALP SERPL-CCNC: 49 U/L (ref 30–99)
ALT SERPL-CCNC: 6 U/L (ref 2–50)
ANION GAP SERPL CALC-SCNC: 7 MMOL/L (ref 0–11.9)
AST SERPL-CCNC: 11 U/L (ref 12–45)
BASOPHILS # BLD AUTO: 0.6 % (ref 0–1.8)
BASOPHILS # BLD: 0.03 K/UL (ref 0–0.12)
BILIRUB SERPL-MCNC: 0.4 MG/DL (ref 0.1–1.5)
BUN SERPL-MCNC: 18 MG/DL (ref 8–22)
CALCIUM SERPL-MCNC: 9.7 MG/DL (ref 8.5–10.5)
CHLORIDE SERPL-SCNC: 106 MMOL/L (ref 96–112)
CO2 SERPL-SCNC: 25 MMOL/L (ref 20–33)
CREAT SERPL-MCNC: 0.71 MG/DL (ref 0.5–1.4)
EOSINOPHIL # BLD AUTO: 0.25 K/UL (ref 0–0.51)
EOSINOPHIL NFR BLD: 4.8 % (ref 0–6.9)
ERYTHROCYTE [DISTWIDTH] IN BLOOD BY AUTOMATED COUNT: 46.8 FL (ref 35.9–50)
FLUAV+FLUBV AG SPEC QL IA: NORMAL
GLOBULIN SER CALC-MCNC: 3.1 G/DL (ref 1.9–3.5)
GLUCOSE SERPL-MCNC: 96 MG/DL (ref 65–99)
HCG SERPL QL: NEGATIVE
HCT VFR BLD AUTO: 44.2 % (ref 37–47)
HGB BLD-MCNC: 14.2 G/DL (ref 12–16)
IMM GRANULOCYTES # BLD AUTO: 0.04 K/UL (ref 0–0.11)
IMM GRANULOCYTES NFR BLD AUTO: 0.8 % (ref 0–0.9)
LIPASE SERPL-CCNC: 17 U/L (ref 11–82)
LYMPHOCYTES # BLD AUTO: 1.74 K/UL (ref 1–4.8)
LYMPHOCYTES NFR BLD: 33.5 % (ref 22–41)
MCH RBC QN AUTO: 30.7 PG (ref 27–33)
MCHC RBC AUTO-ENTMCNC: 32.1 G/DL (ref 33.6–35)
MCV RBC AUTO: 95.5 FL (ref 81.4–97.8)
MONOCYTES # BLD AUTO: 0.53 K/UL (ref 0–0.85)
MONOCYTES NFR BLD AUTO: 10.2 % (ref 0–13.4)
NEUTROPHILS # BLD AUTO: 2.61 K/UL (ref 2–7.15)
NEUTROPHILS NFR BLD: 50.1 % (ref 44–72)
NRBC # BLD AUTO: 0 K/UL
NRBC BLD-RTO: 0 /100 WBC
PLATELET # BLD AUTO: 189 K/UL (ref 164–446)
PMV BLD AUTO: 11.3 FL (ref 9–12.9)
POTASSIUM SERPL-SCNC: 3.7 MMOL/L (ref 3.6–5.5)
PROT SERPL-MCNC: 7 G/DL (ref 6–8.2)
RBC # BLD AUTO: 4.63 M/UL (ref 4.2–5.4)
SIGNIFICANT IND 70042: NORMAL
SITE SITE: NORMAL
SODIUM SERPL-SCNC: 138 MMOL/L (ref 135–145)
SOURCE SOURCE: NORMAL
WBC # BLD AUTO: 5.2 K/UL (ref 4.8–10.8)

## 2018-01-29 PROCEDURE — 700105 HCHG RX REV CODE 258: Performed by: EMERGENCY MEDICINE

## 2018-01-29 PROCEDURE — 87400 INFLUENZA A/B EACH AG IA: CPT

## 2018-01-29 PROCEDURE — 83690 ASSAY OF LIPASE: CPT

## 2018-01-29 PROCEDURE — 85025 COMPLETE CBC W/AUTO DIFF WBC: CPT

## 2018-01-29 PROCEDURE — 80053 COMPREHEN METABOLIC PANEL: CPT

## 2018-01-29 PROCEDURE — 700111 HCHG RX REV CODE 636 W/ 250 OVERRIDE (IP): Performed by: EMERGENCY MEDICINE

## 2018-01-29 PROCEDURE — 96375 TX/PRO/DX INJ NEW DRUG ADDON: CPT

## 2018-01-29 PROCEDURE — 84703 CHORIONIC GONADOTROPIN ASSAY: CPT

## 2018-01-29 PROCEDURE — 96374 THER/PROPH/DIAG INJ IV PUSH: CPT

## 2018-01-29 PROCEDURE — 99285 EMERGENCY DEPT VISIT HI MDM: CPT

## 2018-01-29 RX ORDER — SODIUM CHLORIDE 9 MG/ML
1000 INJECTION, SOLUTION INTRAVENOUS ONCE
Status: COMPLETED | OUTPATIENT
Start: 2018-01-29 | End: 2018-01-29

## 2018-01-29 RX ORDER — ONDANSETRON 2 MG/ML
4 INJECTION INTRAMUSCULAR; INTRAVENOUS ONCE
Status: COMPLETED | OUTPATIENT
Start: 2018-01-29 | End: 2018-01-29

## 2018-01-29 RX ORDER — ONDANSETRON 4 MG/1
4 TABLET, ORALLY DISINTEGRATING ORAL EVERY 6 HOURS PRN
Qty: 12 TAB | Refills: 0 | Status: SHIPPED | OUTPATIENT
Start: 2018-01-29

## 2018-01-29 RX ADMIN — ONDANSETRON 4 MG: 2 INJECTION INTRAMUSCULAR; INTRAVENOUS at 09:25

## 2018-01-29 RX ADMIN — SODIUM CHLORIDE 1000 ML: 9 INJECTION, SOLUTION INTRAVENOUS at 09:25

## 2018-01-29 RX ADMIN — PROCHLORPERAZINE EDISYLATE 10 MG: 5 INJECTION INTRAMUSCULAR; INTRAVENOUS at 11:28

## 2018-01-29 ASSESSMENT — ENCOUNTER SYMPTOMS
VOMITING: 1
BLOOD IN STOOL: 0
SHORTNESS OF BREATH: 0
FEVER: 1
ABDOMINAL PAIN: 1
CONSTIPATION: 0
SORE THROAT: 0
DIARRHEA: 0
HEADACHES: 0
MYALGIAS: 1
NAUSEA: 1
FLANK PAIN: 0
DIZZINESS: 0

## 2018-01-29 ASSESSMENT — LIFESTYLE VARIABLES: DO YOU DRINK ALCOHOL: NO

## 2018-01-29 NOTE — DISCHARGE INSTRUCTIONS
Abdominal Pain (Nonspecific)  Your exam might not show the exact reason you have abdominal pain. Since there are many different causes of abdominal pain, another checkup and more tests may be needed. It is very important to follow up for lasting (persistent) or worsening symptoms. A possible cause of abdominal pain in any person who still has his or her appendix is acute appendicitis. Appendicitis is often hard to diagnose. Normal blood tests, urine tests, ultrasound, and CT scans do not completely rule out early appendicitis or other causes of abdominal pain. Sometimes, only the changes that happen over time will allow appendicitis and other causes of abdominal pain to be determined. Other potential problems that may require surgery may also take time to become more apparent. Because of this, it is important that you follow all of the instructions below.  HOME CARE INSTRUCTIONS   · Rest as much as possible.   · Do not eat solid food until your pain is gone.   · While adults or children have pain: A diet of water, weak decaffeinated tea, broth or bouillon, gelatin, oral rehydration solutions (ORS), frozen ice pops, or ice chips may be helpful.   · When pain is gone in adults or children: Start a light diet (dry toast, crackers, applesauce, or white rice). Increase the diet slowly as long as it does not bother you. Eat no dairy products (including cheese and eggs) and no spicy, fatty, fried, or high-fiber foods.   · Use no alcohol, caffeine, or cigarettes.   · Take your regular medicines unless your caregiver told you not to.   · Take any prescribed medicine as directed.   · Only take over-the-counter or prescription medicines for pain, discomfort, or fever as directed by your caregiver. Do not give aspirin to children.   If your caregiver has given you a follow-up appointment, it is very important to keep that appointment. Not keeping the appointment could result in a permanent injury and/or lasting (chronic) pain  and/or disability. If there is any problem keeping the appointment, you must call to reschedule.   SEEK IMMEDIATE MEDICAL CARE IF:   · Your pain is not gone in 24 hours.   · Your pain becomes worse, changes location, or feels different.   · You or your child has an oral temperature above 102° F (38.9° C), not controlled by medicine.   · Your baby is older than 3 months with a rectal temperature of 102° F (38.9° C) or higher.   · Your baby is 3 months old or younger with a rectal temperature of 100.4° F (38° C) or higher.   · You have shaking chills.   · You keep throwing up (vomiting) or cannot drink liquids.   · There is blood in your vomit or you see blood in your bowel movements.   · Your bowel movements become dark or black.   · You have frequent bowel movements.   · Your bowel movements stop (become blocked) or you cannot pass gas.   · You have bloody, frequent, or painful urination.   · You have yellow discoloration in the skin or whites of the eyes.   · Your stomach becomes bloated or bigger.   · You have dizziness or fainting.   · You have chest or back pain.   MAKE SURE YOU:   · Understand these instructions.   · Will watch your condition.   · Will get help right away if you are not doing well or get worse.   Document Released: 12/18/2006 Document Revised: 03/11/2013 Document Reviewed: 11/15/2010  [employer]® Patient Information ©2013 [employer].

## 2018-01-29 NOTE — ED NOTES
"Pt c/o abdominal pain 7/10 that radiates to the flank. States that she \"hasn't been able to keep anything down from including toast and water.\"  Family at bedside,   "
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
## 2018-01-29 NOTE — ED NOTES
.All lines and monitors discontinued. Discharge instructions given, questions answered.  Ambulated out of ER, escorted by RN and family.  Instructed not to drive after taking pain medication and pt verbalizes understanding.  Rx x 1 given.

## 2018-01-29 NOTE — ED TRIAGE NOTES
Karolina Ventura 37 y.o. female   Chief Complaint   Patient presents with   • N/V     Since Saturday.  LMP 3 days ago.   • Abdominal Pain     Generalized.        Pt returned to lobby and educated on triage process.  Advised to notify RN with changes or concerns.

## 2018-11-19 ENCOUNTER — OFFICE VISIT (OUTPATIENT)
Dept: URGENT CARE | Facility: PHYSICIAN GROUP | Age: 38
End: 2018-11-19
Payer: MEDICAID

## 2018-11-19 ENCOUNTER — APPOINTMENT (OUTPATIENT)
Dept: RADIOLOGY | Facility: IMAGING CENTER | Age: 38
End: 2018-11-19
Attending: PHYSICIAN ASSISTANT
Payer: MEDICAID

## 2018-11-19 VITALS
WEIGHT: 182 LBS | OXYGEN SATURATION: 98 % | SYSTOLIC BLOOD PRESSURE: 120 MMHG | DIASTOLIC BLOOD PRESSURE: 80 MMHG | BODY MASS INDEX: 29.25 KG/M2 | HEART RATE: 96 BPM | HEIGHT: 66 IN | TEMPERATURE: 98.6 F

## 2018-11-19 DIAGNOSIS — R06.02 SHORTNESS OF BREATH: ICD-10-CM

## 2018-11-19 DIAGNOSIS — J40 BRONCHITIS: ICD-10-CM

## 2018-11-19 DIAGNOSIS — N64.4 BREAST PAIN IN FEMALE: ICD-10-CM

## 2018-11-19 PROCEDURE — 71046 X-RAY EXAM CHEST 2 VIEWS: CPT | Performed by: FAMILY MEDICINE

## 2018-11-19 PROCEDURE — 99214 OFFICE O/P EST MOD 30 MIN: CPT | Performed by: PHYSICIAN ASSISTANT

## 2018-11-19 RX ORDER — BENZONATATE 100 MG/1
100 CAPSULE ORAL 3 TIMES DAILY PRN
Qty: 30 CAP | Refills: 0 | Status: SHIPPED | OUTPATIENT
Start: 2018-11-19

## 2018-11-19 ASSESSMENT — ENCOUNTER SYMPTOMS
WHEEZING: 0
HEADACHES: 0
ABDOMINAL PAIN: 0
FEVER: 0
EYE PAIN: 0
SORE THROAT: 0
SHORTNESS OF BREATH: 1
VOMITING: 0
CONSTIPATION: 0
PALPITATIONS: 0
NAUSEA: 0
HEMOPTYSIS: 0
DIZZINESS: 0
CHILLS: 1
BLURRED VISION: 0
DIARRHEA: 0
COUGH: 1
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
## 2019-02-16 ENCOUNTER — OFFICE VISIT (OUTPATIENT)
Dept: URGENT CARE | Facility: PHYSICIAN GROUP | Age: 39
End: 2019-02-16
Payer: MEDICAID

## 2019-02-16 VITALS
SYSTOLIC BLOOD PRESSURE: 138 MMHG | WEIGHT: 188 LBS | DIASTOLIC BLOOD PRESSURE: 76 MMHG | RESPIRATION RATE: 20 BRPM | TEMPERATURE: 100.6 F | BODY MASS INDEX: 30.34 KG/M2 | HEART RATE: 109 BPM | OXYGEN SATURATION: 96 %

## 2019-02-16 DIAGNOSIS — B00.1 COLD SORE: ICD-10-CM

## 2019-02-16 DIAGNOSIS — J11.1 FLU SYNDROME: ICD-10-CM

## 2019-02-16 PROCEDURE — 99214 OFFICE O/P EST MOD 30 MIN: CPT | Performed by: NURSE PRACTITIONER

## 2019-02-16 RX ORDER — BENZONATATE 200 MG/1
200 CAPSULE ORAL 3 TIMES DAILY PRN
Qty: 60 CAP | Refills: 0 | Status: SHIPPED | OUTPATIENT
Start: 2019-02-16

## 2019-02-16 RX ORDER — VALACYCLOVIR HYDROCHLORIDE 1 G/1
2000 TABLET, FILM COATED ORAL 2 TIMES DAILY
Qty: 4 TAB | Refills: 0 | Status: SHIPPED | OUTPATIENT
Start: 2019-02-16

## 2019-02-16 RX ORDER — OSELTAMIVIR PHOSPHATE 75 MG/1
75 CAPSULE ORAL 2 TIMES DAILY
Qty: 10 CAP | Refills: 0 | Status: SHIPPED | OUTPATIENT
Start: 2019-02-16

## 2019-02-16 ASSESSMENT — ENCOUNTER SYMPTOMS
HEADACHES: 1
ORTHOPNEA: 0
DIARRHEA: 0
CHILLS: 1
SHORTNESS OF BREATH: 0
NAUSEA: 0
COUGH: 1
EYE DISCHARGE: 0
SPUTUM PRODUCTION: 1
WHEEZING: 0
MYALGIAS: 1
FEVER: 1
SORE THROAT: 0

## 2019-02-16 NOTE — LETTER
February 16, 2019        [facility] [address]  [place] [address]        Karolina was seen in our clinic today and she is excused from work for tomorrow, Monday and Tuesday. Thank you.  If you have any questions or concerns, please don't hesitate to call.        Sincerely,        KAILASH Cerna.P.MICHAELA.    Electronically Signed

## 2019-02-16 NOTE — PROGRESS NOTES
Subjective:      Karolina Ventura is a 38 y.o. female who presents with Cold Sores (x 4 days ); Nasal Congestion (x 5 days ); and Fever (x 5 days )            HPI New problem. 38 year old female with cold sores for 4 days, nasal congestion, cough and fever, chills for 5 days. She denies any nausea or vomiting, diarrhea or sore throat. She has no shortness of breath or wheezing. No flu shot this year. She has been taking otc medications for her symptoms.  Codeine-guaifenesin; Cough syrup; Nubain [methylpara-nalbuphine-propylpara]; and Percocet [apap-fd&c red #40 [place]-oxycodone]  Current Outpatient Prescriptions on File Prior to Visit   Medication Sig Dispense Refill   • benzonatate (TESSALON) 100 MG Cap Take 1 Cap by mouth 3 times a day as needed for Cough. (Patient not taking: Reported on 2/16/2019) 30 Cap 0   • ondansetron (ZOFRAN ODT) 4 MG TABLET DISPERSIBLE Take 1 Tab by mouth every 6 hours as needed for Nausea. (Patient not taking: Reported on 2/16/2019) 12 Tab 0     No current facility-administered medications on file prior to visit.      Social History     Social History   • Marital status:      Spouse name: N/A   • Number of children: N/A   • Years of education: N/A     Occupational History   • Not on file.     Social History Main Topics   • Smoking status: Former Smoker     Packs/day: 1.00     Years: 15.00     Types: Cigarettes   • Smokeless tobacco: Never Used   • Alcohol use No   • Drug use: No   • Sexual activity: Yes     Partners: Male     Other Topics Concern   • Not on file     Social History Narrative   • No narrative on file     family history is not on file.      Review of Systems   Constitutional: Positive for chills, fever and malaise/fatigue.   HENT: Positive for congestion. Negative for sore throat.    Eyes: Negative for discharge.   Respiratory: Positive for cough and sputum production. Negative for shortness of breath and wheezing.    Cardiovascular: Negative for chest pain  and orthopnea.   Gastrointestinal: Negative for diarrhea and nausea.   Musculoskeletal: Positive for myalgias.   Skin:        +cold sores   Neurological: Positive for headaches.   Endo/Heme/Allergies: Negative for environmental allergies.          Objective:     /76   Pulse (!) 109   Temp (!) 38.1 °C (100.6 °F) (Temporal)   Resp 20   Wt 85.3 kg (188 lb)   SpO2 96%   BMI 30.34 kg/m²      Physical Exam   Constitutional: She is oriented to person, place, and time. She appears well-developed and well-nourished. She appears ill. No distress.   HENT:   Head: Normocephalic and atraumatic.   Right Ear: External ear and ear canal normal. Tympanic membrane is not injected and not perforated. No middle ear effusion.   Left Ear: External ear and ear canal normal. Tympanic membrane is not injected and not perforated.  No middle ear effusion.   Nose: Mucosal edema present.   Mouth/Throat: Posterior oropharyngeal erythema present. No oropharyngeal exudate.   Eyes: Conjunctivae are normal. Right eye exhibits no discharge. Left eye exhibits no discharge.   Neck: Normal range of motion. Neck supple.   Cardiovascular: Normal rate, regular rhythm and normal heart sounds.    No murmur heard.  Pulmonary/Chest: Effort normal and breath sounds normal. No respiratory distress.   Musculoskeletal: Normal range of motion.   Normal movement of all 4 extremities.   Lymphadenopathy:     She has no cervical adenopathy.        Right: No supraclavicular adenopathy present.        Left: No supraclavicular adenopathy present.   Neurological: She is alert and oriented to person, place, and time. Gait normal.   Skin: Skin is warm and dry. Rash noted. Rash is vesicular.   To lower lips   Psychiatric: She has a normal mood and affect. Her behavior is normal. Thought content normal.   Nursing note and vitals reviewed.              Assessment/Plan:     1. Flu syndrome  oseltamivir (TAMIFLU) 75 MG Cap    benzonatate (TESSALON) 200 MG capsule   2.  Cold sore  valacyclovir (VALTREX) 1 GM Tab      likely influenza  Differential diagnosis, natural history, supportive care, and indications for immediate follow-up discussed at length.

## 2019-09-21 ENCOUNTER — OFFICE VISIT (OUTPATIENT)
Dept: URGENT CARE | Facility: PHYSICIAN GROUP | Age: 39
End: 2019-09-21
Payer: MEDICAID

## 2019-09-21 VITALS
SYSTOLIC BLOOD PRESSURE: 152 MMHG | HEART RATE: 86 BPM | WEIGHT: 193 LBS | RESPIRATION RATE: 16 BRPM | OXYGEN SATURATION: 96 % | BODY MASS INDEX: 31.15 KG/M2 | DIASTOLIC BLOOD PRESSURE: 88 MMHG | TEMPERATURE: 97.7 F

## 2019-09-21 DIAGNOSIS — T23.039A: ICD-10-CM

## 2019-09-21 PROCEDURE — 99214 OFFICE O/P EST MOD 30 MIN: CPT | Performed by: FAMILY MEDICINE

## 2019-09-21 RX ORDER — DICLOFENAC SODIUM 75 MG/1
75 TABLET, DELAYED RELEASE ORAL 2 TIMES DAILY
Qty: 30 TAB | Refills: 0 | Status: SHIPPED | OUTPATIENT
Start: 2019-09-21
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
## 2019-09-22 ASSESSMENT — ENCOUNTER SYMPTOMS
SHORTNESS OF BREATH: 0
FEVER: 0
VOMITING: 0